# Patient Record
Sex: MALE | Race: ASIAN | URBAN - METROPOLITAN AREA
[De-identification: names, ages, dates, MRNs, and addresses within clinical notes are randomized per-mention and may not be internally consistent; named-entity substitution may affect disease eponyms.]

---

## 2022-02-04 ENCOUNTER — IMMUNIZATION (OUTPATIENT)
Dept: FAMILY MEDICINE | Facility: CLINIC | Age: 32
End: 2022-02-04
Payer: COMMERCIAL

## 2022-02-04 PROCEDURE — 0051A COVID-19,PF,PFIZER (12+ YRS): CPT

## 2022-02-04 PROCEDURE — 91305 COVID-19,PF,PFIZER (12+ YRS): CPT

## 2022-02-04 PROCEDURE — 99207 PR NO CHARGE LOS: CPT

## 2022-02-25 ENCOUNTER — IMMUNIZATION (OUTPATIENT)
Dept: FAMILY MEDICINE | Facility: CLINIC | Age: 32
End: 2022-02-25
Attending: FAMILY MEDICINE
Payer: COMMERCIAL

## 2022-02-25 PROCEDURE — 0052A COVID-19,PF,PFIZER (12+ YRS): CPT

## 2022-02-25 PROCEDURE — 91305 COVID-19,PF,PFIZER (12+ YRS): CPT

## 2022-02-25 PROCEDURE — 99207 PR NO CHARGE LOS: CPT

## 2025-05-04 ENCOUNTER — HOSPITAL ENCOUNTER (EMERGENCY)
Facility: HOSPITAL | Age: 35
Discharge: HOME | End: 2025-05-04
Attending: STUDENT IN AN ORGANIZED HEALTH CARE EDUCATION/TRAINING PROGRAM | Admitting: STUDENT IN AN ORGANIZED HEALTH CARE EDUCATION/TRAINING PROGRAM
Payer: COMMERCIAL

## 2025-05-04 VITALS
DIASTOLIC BLOOD PRESSURE: 83 MMHG | WEIGHT: 135 LBS | TEMPERATURE: 98.5 F | BODY MASS INDEX: 23.05 KG/M2 | OXYGEN SATURATION: 100 % | SYSTOLIC BLOOD PRESSURE: 139 MMHG | RESPIRATION RATE: 22 BRPM | HEIGHT: 64 IN | HEART RATE: 101 BPM

## 2025-05-04 DIAGNOSIS — L29.0 ANAL PRURITUS: ICD-10-CM

## 2025-05-04 DIAGNOSIS — M79.671 PAIN IN BOTH FEET: ICD-10-CM

## 2025-05-04 DIAGNOSIS — M79.672 PAIN IN BOTH FEET: ICD-10-CM

## 2025-05-04 PROCEDURE — 99285 EMERGENCY DEPT VISIT HI MDM: CPT

## 2025-05-04 PROCEDURE — 250N000013 HC RX MED GY IP 250 OP 250 PS 637: Performed by: STUDENT IN AN ORGANIZED HEALTH CARE EDUCATION/TRAINING PROGRAM

## 2025-05-04 RX ORDER — ACETAMINOPHEN 325 MG/1
975 TABLET ORAL ONCE
Status: COMPLETED | OUTPATIENT
Start: 2025-05-04 | End: 2025-05-04

## 2025-05-04 RX ORDER — IBUPROFEN 600 MG/1
600 TABLET, FILM COATED ORAL ONCE
Status: COMPLETED | OUTPATIENT
Start: 2025-05-04 | End: 2025-05-04

## 2025-05-04 RX ADMIN — IBUPROFEN 600 MG: 600 TABLET, FILM COATED ORAL at 02:47

## 2025-05-04 RX ADMIN — ACETAMINOPHEN 975 MG: 325 TABLET ORAL at 02:47

## 2025-05-04 ASSESSMENT — COLUMBIA-SUICIDE SEVERITY RATING SCALE - C-SSRS
6. HAVE YOU EVER DONE ANYTHING, STARTED TO DO ANYTHING, OR PREPARED TO DO ANYTHING TO END YOUR LIFE?: NO
1. IN THE PAST MONTH, HAVE YOU WISHED YOU WERE DEAD OR WISHED YOU COULD GO TO SLEEP AND NOT WAKE UP?: NO
2. HAVE YOU ACTUALLY HAD ANY THOUGHTS OF KILLING YOURSELF IN THE PAST MONTH?: NO

## 2025-05-04 ASSESSMENT — ACTIVITIES OF DAILY LIVING (ADL): ADLS_ACUITY_SCORE: 41

## 2025-05-04 NOTE — ED PROVIDER NOTES
EMERGENCY DEPARTMENT ENCOUNTER      NAME: Aditya Graham  AGE: 34 year old male  YOB: 1990  MRN: [id number]  EVALUATION DATE & TIME: 5/4/2025  2:11 AM    PCP: No primary care provider on file.    ED PROVIDER: Artur Munguia MD      Chief Complaint   Patient presents with    Foot Pain    Hemorrhoids         FINAL IMPRESSION:  1. Pain in both feet    2. Anal pruritus          ED COURSE & MEDICAL DECISION MAKING:    Pertinent Labs & Imaging studies reviewed. (See chart for details)  34 year old male presents to the Emergency Department for evaluation of foot pain, hemorrhoids    ED Course as of 05/04/25 0328   Sun May 04, 2025   0226 Patient is a 34-year-old male who is previously healthy and presents to the emergency department with bilateral foot pain and an itchy anus with bright red blood with stooling.  Patient came in with 1 issue, he is homeless.  His feet have bunions, and are dirty, and likely painful due to his poor foot wear and being outside frequently.  I suspect that his anal itching and bright red blood are due to hemorrhoids.  Will provide the patient with over-the-counter pain medications, and sandwiches, and then discharge as I do not feel that imaging or lab work is indicated and have a low suspicion of emergency pathology.   0325 After patient was allowed to rest in the emergency department for over an hour and eat food, and no emergency pathology identified on history or exam, patient discharged.  Patient was refusing to get dressed and leave his room, so security was called.  The patient was stating that he needed new clothes, so he was provided with scrubs.  He then stated that he could not walk, but then got up and walked to his belongings.  He began to curse at staff, and he was escorted out of the emergency department by security after we suggested that we would be calling the police if he did not leave soon.       Medical Decision Making  I obtained history from EMS  Care  "impacted by homelessness  Discharge. No recommendations on prescription strength medication(s). See documentation for any additional details.    MIPS (CTPE, Dental pain, [place], Sinusitis, Asthma/COPD, Head Trauma): Not Applicable    SEPSIS: None            At the conclusion of the encounter I discussed the results of all of the tests and the disposition. The questions were answered. The patient or family acknowledged understanding and was agreeable with the care plan.     0 minutes of critical care time     MEDICATIONS GIVEN IN THE EMERGENCY:  Medications   ibuprofen (ADVIL/MOTRIN) tablet 600 mg (600 mg Oral $Given 5/4/25 0247)   acetaminophen (TYLENOL) tablet 975 mg (975 mg Oral $Given 5/4/25 0247)       NEW PRESCRIPTIONS STARTED AT TODAY'S ER VISIT  New Prescriptions    No medications on file          =================================================================    HPI    Patient information was obtained from: patient, EMS    Use of : N/A        Aditya Graham is a 34 year old male with a pertinent history of homelessness who presents to this ED via EMS for evaluation of foot pain and hemorrhoids. Patient brought in by EMS. He sleeps outside, and has one shoe. He endorses chronic bilateral foot pain from walking outside. He also endorses an itchy anus and intermittent bright red blood when stooling.      PAST MEDICAL HISTORY:  No past medical history on file.    PAST SURGICAL HISTORY:  No past surgical history on file.        CURRENT MEDICATIONS:    No current outpatient medications on file.      ALLERGIES:  Allergies   Allergen Reactions    Flonase [Fluticasone]        FAMILY HISTORY:  No family history on file.    SOCIAL HISTORY:   Social History     Socioeconomic History    Marital status: Single       VITALS:  /77   Pulse 107   Temp 98.5  F (36.9  C) (Oral)   Resp 22   Ht 1.626 m (5' 4\")   Wt 61.2 kg (135 lb)   SpO2 100%   BMI 23.17 kg/m      PHYSICAL EXAM    Physical Exam  Vitals and " nursing note reviewed.   Constitutional:       General: He is not in acute distress.     Appearance: Normal appearance. He is normal weight. He is not ill-appearing.   HENT:      Head: Normocephalic and atraumatic.      Nose: Nose normal.      Mouth/Throat:      Mouth: Mucous membranes are moist.   Eyes:      Extraocular Movements: Extraocular movements intact.      Conjunctiva/sclera: Conjunctivae normal.   Cardiovascular:      Rate and Rhythm: Normal rate.      Pulses: Normal pulses.   Pulmonary:      Effort: Pulmonary effort is normal.   Abdominal:      General: Abdomen is flat. There is no distension.      Palpations: Abdomen is soft.      Tenderness: There is no abdominal tenderness.   Musculoskeletal:         General: No swelling or signs of injury. Normal range of motion.      Cervical back: Normal range of motion.      Right lower leg: No edema.      Left lower leg: No edema.      Comments: Bilateral feet are dirty, with bunions, no wounds or ulcerations. Normal pulses, strength, and sensation   Skin:     General: Skin is warm and dry.   Neurological:      General: No focal deficit present.      Mental Status: He is alert and oriented to person, place, and time. Mental status is at baseline.   Psychiatric:         Mood and Affect: Mood normal.         Behavior: Behavior normal.         Thought Content: Thought content normal.         Judgment: Judgment normal.            LAB:  All pertinent labs reviewed and interpreted.       RADIOLOGY:  Reviewed all pertinent imaging. Please see official radiology report.  No orders to display       PROCEDURES:   None      [employer] System Documentation:   CMS Diagnoses: None             Taylor JACKSON, am serving as a scribe to document services personally performed by Artur Munguia MD based on my observation and the provider's statements to me. I, Artur Munguia MD, attest that Taylor Barcenas is acting in a scribe capacity, has observed my  performance of the services and has documented them in accordance with my direction.    Artur Munguia MD  [facility] EMERGENCY DEPARTMENT  [facility] [facility] [id number] [phone]       Artur Munguia MD  05/04/25 0328

## 2025-05-04 NOTE — ED TRIAGE NOTES
Brought in by [facility]. Patient was picked up at [facility]. Patient is homeless. Reports chronic bilateral foot pain. States pain is worse tonight. Also reports pain in rectum and bright red blood when having a BM.      Triage Assessment (Adult)       Row Name 05/04/25 0207          Triage Assessment    Airway WDL WDL        Respiratory WDL    Respiratory WDL WDL        Cardiac WDL    Cardiac WDL WDL

## 2025-05-04 NOTE — CARE PLAN
05/04/25 0329   Aggressive/Violent Post Event Doc   When was the event?  05/04/25   Where was the event?  Patient's room, upon discharge   What was event? Patient became uncooperative and verbally abusive to staff present